# Patient Record
Sex: FEMALE | Race: WHITE
[De-identification: names, ages, dates, MRNs, and addresses within clinical notes are randomized per-mention and may not be internally consistent; named-entity substitution may affect disease eponyms.]

---

## 2020-06-19 ENCOUNTER — HOSPITAL ENCOUNTER (OUTPATIENT)
Dept: HOSPITAL 53 - M PLALAB | Age: 75
End: 2020-06-19
Attending: INTERNAL MEDICINE
Payer: MEDICARE

## 2020-06-19 DIAGNOSIS — Z90.49: ICD-10-CM

## 2020-06-19 DIAGNOSIS — G89.29: ICD-10-CM

## 2020-06-19 DIAGNOSIS — M54.9: ICD-10-CM

## 2020-06-19 DIAGNOSIS — M51.36: Primary | ICD-10-CM

## 2021-04-14 NOTE — REPPI
Assessment complete. VSS. Patient resting in bed. Respirations even and easy. Call light in reach. Fall precautions in place. No needs expressed at this time. Will continue to monitor. Five views lumbar spine:  06/19/2020.

 

Indication:  Low back pain.

 

Comparison:  08/26/2002 and 04/16/2013.

 

Findings:

 

There is no acute fracture, subluxation or dislocation.  Disc space narrowing is

present throughout most pronounced at L4/L5.  The patient is status post

cholecystectomy.  Aortoiliac atherosclerotic disease is present.  No lytic or

blastic lesions are detected.

 

Impression:

 

No acute osseous injuries of the lumbar spine.  Advanced spondylosis most

pronounced at L4/L5.

 

 

Electronically Signed by

Gee Tineo DO 06/19/2020 10:38 A

## 2022-03-22 ENCOUNTER — HOSPITAL ENCOUNTER (OUTPATIENT)
Dept: HOSPITAL 53 - M SMT | Age: 77
End: 2022-03-22
Attending: NURSE PRACTITIONER
Payer: MEDICARE

## 2022-03-22 DIAGNOSIS — Z87.440: Primary | ICD-10-CM

## 2022-03-22 DIAGNOSIS — Z79.899: ICD-10-CM

## 2022-03-22 LAB
APPEARANCE UR: CLEAR
BACTERIA UR QL AUTO: NEGATIVE
BILIRUB UR QL STRIP.AUTO: NEGATIVE
GLUCOSE UR QL STRIP.AUTO: NEGATIVE MG/DL
HGB UR QL STRIP.AUTO: NEGATIVE
KETONES UR QL STRIP.AUTO: NEGATIVE MG/DL
LEUKOCYTE ESTERASE UR QL STRIP.AUTO: NEGATIVE
NITRITE UR QL STRIP.AUTO: NEGATIVE
PH UR STRIP.AUTO: 6 UNITS (ref 5–9)
PROT UR QL STRIP.AUTO: NEGATIVE MG/DL
RBC # UR AUTO: 0 /HPF (ref 0–3)
SP GR UR STRIP.AUTO: 1.01 (ref 1–1.03)
SQUAMOUS #/AREA URNS AUTO: 0 /HPF (ref 0–6)
UROBILINOGEN UR QL STRIP.AUTO: 0.2 MG/DL (ref 0–2)
WBC #/AREA URNS AUTO: 0 /HPF (ref 0–3)